# Patient Record
Sex: FEMALE | Race: WHITE | Employment: FULL TIME | URBAN - METROPOLITAN AREA
[De-identification: names, ages, dates, MRNs, and addresses within clinical notes are randomized per-mention and may not be internally consistent; named-entity substitution may affect disease eponyms.]

---

## 2021-10-06 ENCOUNTER — THERAPY VISIT (OUTPATIENT)
Dept: PHYSICAL THERAPY | Facility: CLINIC | Age: 61
End: 2021-10-06
Payer: COMMERCIAL

## 2021-10-06 DIAGNOSIS — M54.2 NECK PAIN: ICD-10-CM

## 2021-10-06 PROCEDURE — 97161 PT EVAL LOW COMPLEX 20 MIN: CPT | Mod: GP | Performed by: PHYSICAL THERAPIST

## 2021-10-06 PROCEDURE — 97110 THERAPEUTIC EXERCISES: CPT | Mod: GP | Performed by: PHYSICAL THERAPIST

## 2021-10-06 PROCEDURE — G0283 ELEC STIM OTHER THAN WOUND: HCPCS | Mod: GP | Performed by: PHYSICAL THERAPIST

## 2021-10-06 NOTE — LETTER
[facility]  [address]  [facility] [address]  [phone]    2021    Re: Mark Quintanilla   :   1960  MRN:  [id number]   REFERRING PHYSICIAN:   Guilherme FELICIANO [facility]  Date of Initial Evaluation:  10/4/21  Visits:  Rxs Used: 1  Reason for Referral:  Neck pain    EVALUATION SUMMARY    Physical Therapy Initial Evaluation  Subjective:  The history is provided by the patient. No  was used.     Patient Health History  Mark Quintanilla being seen for neck.   Pain is reported as 3/10 on pain scale.  General health as reported by patient is good.    Pertinent medical history includes: heart problems, high blood pressure, numbness/tingling, overweight and sleep disorder/apnea. Other medical history details: Afib.     Red flags:  None as reported by patient.  Other medical allergies details: Penicillin.   Surgeries include:  Orthopedic surgery. Other surgery history details: knees and  back.    Current medications:  High blood pressure medication and cardiac medication.    Current occupation .                 Therapist Generated HPI Evaluation  Problem details: Has had chronic neck issues. Most recently had a flare-up which he received an injection 2021 which helped 80%.  .         Type of problem:  Cervical spine.    Condition occurred with:  Insidious onset.  Where condition occurred: for unknown reasons.  Patient reports pain:  Cervical right side and central cervical spine.  Pain is described as aching, shooting and sharp and is constant.  Pain radiates to:  Shoulder right. Pain is worse in the P.M..  Since onset symptoms are unchanged.  Symptoms are exacerbated by certain positions, rotating head and looking up or down  and relieved by NSAID's and muscle relaxants.  Special tests included:  MRI.  Restrictions due to condition include:  Working in normal  job without restrictions.  Barriers include:  None as reported by patient.                  Objective:    Hortencia Cervical Evaluation  Posture:  Protruding Head: yes  Correction of Posture: better    Movement Loss:  Flexion (Flex): min and pain  Retraction (RET): major, mod and pain  Extension (EXT): major and pain  Lateral Flexion Right (LF R): mod and pain  Lateral Flexion Left (LF L): min and pain  Rotation Right (ROT R): mod and pain  Rotation Left (ROT L): major, mod and pain    Assessment/Plan:    Patient is a 61 year old female with cervical complaints.    Patient has the following significant findings with corresponding treatment plan.                Diagnosis 1:  Neck pain  Pain -  hot/cold therapy, electric stimulation, manual therapy, self management, education and directional preference exercise  Decreased ROM/flexibility - manual therapy and therapeutic exercise  Impaired muscle performance - neuro re-education  Decreased function - therapeutic activities    Previous and current functional limitations:  (See Goal Flow Sheet for this information)    Short term and Long term goals: (See Goal Flow Sheet for this information)     Communication ability:  Patient appears to be able to clearly communicate and understand verbal and written communication and follow directions correctly.  Treatment Explanation - The following has been discussed with the patient:   RX ordered/plan of care  Anticipated outcomes  Possible risks and side effects    This patient would benefit from PT intervention to resume normal activities.   Rehab potential is good.  Frequency:  1 X week, once daily  Duration:  for 8 weeks  Discharge Plan:  Achieve all LTG.  Independent in home treatment program.  Reach maximal therapeutic benefit.    Thank you for your referral.    INQUIRIES  Therapist: Ainsley Mares DPT   [facility]  [address]  [facility] [address]  Phone:  [phone]  Fax: [phone]

## 2021-10-06 NOTE — PROGRESS NOTES
Physical Therapy Initial Evaluation  Subjective:  The history is provided by the patient. No  was used.   Patient Health History  Mark Quintanilla being seen for neck.          Pain is reported as 3/10 on pain scale.  General health as reported by patient is good.  Pertinent medical history includes: heart problems, high blood pressure, numbness/tingling, overweight and sleep disorder/apnea. Other medical history details: Afib.   Red flags:  None as reported by patient.   Other medical allergies details: Penicillin.   Surgeries include:  Orthopedic surgery. Other surgery history details: knees and  back.    Current medications:  High blood pressure medication and cardiac medication.    Current occupation .                     Therapist Generated HPI Evaluation  Problem details: Has had chronic neck issues. Most recently had a flare-up which he received an injection 9/21/2021 which helped 80%.  .         Type of problem:  Cervical spine.      Condition occurred with:  Insidious onset.  Where condition occurred: for unknown reasons.  Patient reports pain:  Cervical right side and central cervical spine.  Pain is described as aching, shooting and sharp and is constant.  Pain radiates to:  Shoulder right. Pain is worse in the P.M..  Since onset symptoms are unchanged.  Symptoms are exacerbated by certain positions, rotating head and looking up or down  and relieved by NSAID's and muscle relaxants.  Special tests included:  MRI.    Restrictions due to condition include:  Working in normal job without restrictions.  Barriers include:  None as reported by patient.                        Objective:  System    Physical Exam    Hortencia Cervical Evaluation    Posture:      Protruding Head: yes    Correction of Posture: better    Movement Loss:    Flexion (Flex): min and pain  Retraction (RET): major, mod and pain  Extension (EXT): major and pain  Lateral Flexion Right (LF R): mod and  pain  Lateral Flexion Left (LF L): min and pain  Rotation Right (ROT R): mod and pain  Rotation Left (ROT L): major, mod and pain                                                 ROS    Assessment/Plan:    Patient is a 61 year old female with cervical complaints.    Patient has the following significant findings with corresponding treatment plan.                Diagnosis 1:  Neck pain  Pain -  hot/cold therapy, electric stimulation, manual therapy, self management, education and directional preference exercise  Decreased ROM/flexibility - manual therapy and therapeutic exercise  Impaired muscle performance - neuro re-education  Decreased function - therapeutic activities        Previous and current functional limitations:  (See Goal Flow Sheet for this information)    Short term and Long term goals: (See Goal Flow Sheet for this information)     Communication ability:  Patient appears to be able to clearly communicate and understand verbal and written communication and follow directions correctly.  Treatment Explanation - The following has been discussed with the patient:   RX ordered/plan of care  Anticipated outcomes  Possible risks and side effects  This patient would benefit from PT intervention to resume normal activities.   Rehab potential is good.    Frequency:  1 X week, once daily  Duration:  for 8 weeks  Discharge Plan:  Achieve all LTG.  Independent in home treatment program.  Reach maximal therapeutic benefit.    Please refer to the daily flowsheet for treatment today, total treatment time and time spent performing 1:1 timed codes.

## 2021-10-13 ENCOUNTER — THERAPY VISIT (OUTPATIENT)
Dept: PHYSICAL THERAPY | Facility: CLINIC | Age: 61
End: 2021-10-13
Payer: COMMERCIAL

## 2021-10-13 DIAGNOSIS — M54.2 NECK PAIN: ICD-10-CM

## 2021-10-13 PROCEDURE — 97110 THERAPEUTIC EXERCISES: CPT | Mod: GP | Performed by: PHYSICAL THERAPIST

## 2021-10-13 PROCEDURE — 97140 MANUAL THERAPY 1/> REGIONS: CPT | Mod: GP | Performed by: PHYSICAL THERAPIST

## 2021-10-19 ENCOUNTER — THERAPY VISIT (OUTPATIENT)
Dept: PHYSICAL THERAPY | Facility: CLINIC | Age: 61
End: 2021-10-19
Payer: COMMERCIAL

## 2021-10-19 DIAGNOSIS — M54.2 NECK PAIN: ICD-10-CM

## 2021-10-19 PROCEDURE — 97110 THERAPEUTIC EXERCISES: CPT | Mod: GP | Performed by: PHYSICAL THERAPIST

## 2021-12-07 PROBLEM — M54.2 NECK PAIN: Status: RESOLVED | Noted: 2021-10-06 | Resolved: 2021-12-07

## 2021-12-07 NOTE — PROGRESS NOTES
Discharge Note    Progress reporting period is from initial evaluation date (please see noted date below) to Oct 19, 2021.  Linked Episodes   Type: Episode: Status: Noted: Resolved: Last update: Updated by:   PHYSICAL THERAPY Neck pain-10/6/2021 Active 10/6/2021  10/19/2021  4:16 PM Ainsley Mares, PT      Comments:       Mark failed to follow up and current status is unknown.  Please see information below for last relevant information on current status.  Patient seen for 3 visits.    SUBJECTIVE  Subjective changes noted by patient:  Patient reports the neck had a set-back the day after last appointment.  Do pain after appoinmtent but woke up in the AM with limited ROM and increaed pain.  Has had to stop all exercises and just use TENs for pain.  We discussed restarting exercises; is there is not a conitnued increase in ROM and decrease in pain over the next few days he will reach out to his MD.  .  Current pain level is 2/10 (up to 4/10).     Previous pain level was  0/10 (up to 4/10).   Changes in function:  Yes (See Goal flowsheet attached for changes in current functional level)  Adverse reaction to treatment or activity: None    OBJECTIVE  Changes noted in objective findings: CROM Flexion flexion nil loss Retraction mod loss Extension moderate-max loss Rotation (L)       ASSESSMENT/PLAN  Diagnosis: Neck/R arm   Updated problem list and treatment plan:     STG/LTGs have been met or progress has been made towards goals:  Yes, please see goal flowsheet for most current information  Assessment of Progress: current status is unknown.    Last current status: Pt has not made progress   Self Management Plans:  HEP  I have re-evaluated this patient and find that the nature, scope, duration and intensity of the therapy is appropriate for the medical condition of the patient.  Mark continues to require the following intervention to meet STG and LTG's:  HEP.    Recommendations:  Discharge with current home program.   Patient to follow up with MD as needed.    Please refer to the daily flowsheet for treatment today, total treatment time and time spent performing 1:1 timed codes.